# Patient Record
Sex: FEMALE | Race: WHITE | URBAN - METROPOLITAN AREA
[De-identification: names, ages, dates, MRNs, and addresses within clinical notes are randomized per-mention and may not be internally consistent; named-entity substitution may affect disease eponyms.]

---

## 2018-06-11 ENCOUNTER — APPOINTMENT (OUTPATIENT)
Dept: VASCULAR SURGERY | Facility: CLINIC | Age: 53
End: 2018-06-11
Payer: COMMERCIAL

## 2018-06-11 PROCEDURE — 99207 ZZC VEINSOLUTIONS FREE SCREENING: CPT | Performed by: SURGERY

## 2018-06-28 ENCOUNTER — OFFICE VISIT (OUTPATIENT)
Dept: VASCULAR SURGERY | Facility: CLINIC | Age: 53
End: 2018-06-28
Payer: COMMERCIAL

## 2018-06-28 ENCOUNTER — APPOINTMENT (OUTPATIENT)
Dept: VASCULAR SURGERY | Facility: CLINIC | Age: 53
End: 2018-06-28
Payer: COMMERCIAL

## 2018-06-28 DIAGNOSIS — Z53.9 ERRONEOUS ENCOUNTER--DISREGARD: Primary | ICD-10-CM

## 2018-06-28 PROCEDURE — 99203 OFFICE O/P NEW LOW 30 MIN: CPT | Performed by: SURGERY

## 2018-06-28 PROCEDURE — 93970 EXTREMITY STUDY: CPT | Performed by: SURGERY

## 2018-06-28 NOTE — PROGRESS NOTES
[facility] Consultation    Fabby Lamar is a 53-year-old female who presents with complaints of bilateral leg pain and swelling.  The pain is described as an aching pain occurring in the distal thirds of her legs into her ankles and proximal feet bilaterally at the end of almost every day.  The pain sometimes bothers her at night when she is trying to sleep.  Walking improves the pain but compression hose which she has worn for more than 3 months have not provided significant relief.  She denies a history of deep vein thrombosis or superficial thrombophlebitis.    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  She denies any history of significant trauma to her legs.    Past medical history  Medical: Essentially negative  Surgical: Hysterectomy and appendectomy June 2016    Medicines: Vitamin D3 and multivitamins    Allergies: Aspirin- shortness of breath and hives    Social history: She is a non-smoker and has 1-2 drinks of alcohol per week  She is mother of 3 children    12 point review of systems was completed and was reviewed.  It is significant for rare night sweats but is otherwise as noted in the history of present illness and past medical history.    Physical exam  General: Pleasant, fit appearing female in no acute distress.  She is 5 feet 5 inches tall was 120 pounds  HEENT: Normocephalic, atraumatic.  EOMI.  She wears corrective lenses.  Respiratory: Normal respiratory effort  Cardiovascular: Pulse is regular  Psychiatric: Judgment, insight, mood and affect normal  Musculoskeletal: Gait and station are normal.  The joints of her fingers and toes are without deformity.  There is no cyanosis of her nailbeds.  Neurologic: Grossly normal  Extremities: She has numerous telangiectasias about her thighs anterolaterally, bilaterally.  She also has impressive telangiectasias along the distal third of her legs to her ankles bilaterally.  The  telangiectasias are in the distribution of her most significant pain.  There is trace edema of both of her ankles on today's exam.  She has easily palpable dorsalis pedis and posterior tibial pulses.    Duplex ultrasound of her right lower extremity veins reveals no evidence of deep vein thrombosis or deep vein valve incompetence.  Her right great saphenous vein, small saphenous vein are competent.  The Vein of Giacomini and the anterior accessory saphenous vein are not visualized.  There are no incompetent perforators appreciated.  In the left lower extremity is no evidence of deep vein thrombosis or deep vein valve incompetence.  Her left great saphenous vein and small saphenous vein are competent.  The anterior accessory saphenous vein and Vein of Giacomini are not visualized.  There are no incompetent perforators.    Impression  She does not have any significant superficial or deep venous insufficiency that would explain her pain and swelling.  She has numerous telangiectasias which I do not feel of the source of her swelling.  The most intense telangiectasias are in the distal third of the legs and ankles.    We discussed options of continued conservative management with use of compression and leg elevation as well as exercise and dietary measures.  She will try compression on a more dedicated basis to see if this will be of benefit.  If the compression controls her symptoms, this may suffice.    We discussed the option of sclerotherapy of the telangiectasias.  I feel there is probably a 33% chance that injecting these telangiectasias would help her pain.  I know that it would not help her swelling.  I was quite anju in this matter.  Details of sclerotherapy including risks of allergic reaction, ulceration, hyperpigmentation, deep vein thrombosis were all discussed.  She appeared to understand and will pursue conservative management at this time.    PAU Fine MD    Please send a copy of this to Frances  Josie